# Patient Record
Sex: MALE | Race: WHITE | URBAN - METROPOLITAN AREA
[De-identification: names, ages, dates, MRNs, and addresses within clinical notes are randomized per-mention and may not be internally consistent; named-entity substitution may affect disease eponyms.]

---

## 2017-05-03 ENCOUNTER — HISTORICAL (OUTPATIENT)
Dept: SURGERY | Facility: HOSPITAL | Age: 47
End: 2017-05-03

## 2017-05-04 ENCOUNTER — HISTORICAL (OUTPATIENT)
Dept: ANESTHESIOLOGY | Facility: HOSPITAL | Age: 47
End: 2017-05-04

## 2021-10-19 ENCOUNTER — HISTORICAL (OUTPATIENT)
Dept: LAB | Facility: HOSPITAL | Age: 51
End: 2021-10-19

## 2021-10-19 LAB
ABS NEUT (OLG): 3.9 X10(3)/MCL (ref 1.5–6.9)
ALBUMIN SERPL-MCNC: 3.5 GM/DL (ref 3.5–5)
ALBUMIN/GLOB SERPL: 0.8 RATIO (ref 1.1–2)
ALP SERPL-CCNC: 53 UNIT/L (ref 40–150)
ALT SERPL-CCNC: 13 UNIT/L (ref 0–55)
AST SERPL-CCNC: 29 UNIT/L (ref 5–34)
BASOPHILS NFR BLD MANUAL: 0 % (ref 0–1)
BILIRUB SERPL-MCNC: 0.2 MG/DL
BILIRUBIN DIRECT+TOT PNL SERPL-MCNC: 0.1 MG/DL (ref 0–0.5)
BILIRUBIN DIRECT+TOT PNL SERPL-MCNC: 0.1 MG/DL (ref 0–0.8)
BUN SERPL-MCNC: 19 MG/DL (ref 8.4–25.7)
CALCIUM SERPL-MCNC: 9.5 MG/DL (ref 8.4–10.2)
CHLORIDE SERPL-SCNC: 105 MMOL/L (ref 98–107)
CO2 SERPL-SCNC: 31 MMOL/L (ref 22–29)
CREAT SERPL-MCNC: 0.75 MG/DL (ref 0.73–1.18)
EOSINOPHIL NFR BLD MANUAL: 0 % (ref 0–5)
ERYTHROCYTE [DISTWIDTH] IN BLOOD BY AUTOMATED COUNT: 14.8 % (ref 11.5–17)
GLOBULIN SER-MCNC: 4.2 GM/DL (ref 2.4–3.5)
GLUCOSE SERPL-MCNC: 86 MG/DL (ref 74–100)
GRANULOCYTES NFR BLD MANUAL: 51 % (ref 47–80)
HCT VFR BLD AUTO: 35.7 % (ref 42–52)
HGB BLD-MCNC: 11.2 GM/DL (ref 14–18)
LYMPHOCYTES NFR BLD MANUAL: 32 % (ref 15–40)
MCH RBC QN AUTO: 29 PG (ref 27–34)
MCHC RBC AUTO-ENTMCNC: 31 GM/DL (ref 31–36)
MCV RBC AUTO: 92 FL (ref 80–99)
MONOCYTES NFR BLD MANUAL: 17 % (ref 4–12)
PLATELET # BLD AUTO: 147 X10(3)/MCL (ref 140–400)
PLATELET # BLD EST: ADEQUATE 10*3/UL
PMV BLD AUTO: 11.4 FL (ref 6.8–10)
POTASSIUM SERPL-SCNC: 3.9 MMOL/L (ref 3.5–5.1)
PROT SERPL-MCNC: 7.7 GM/DL (ref 6.4–8.3)
RBC # BLD AUTO: 3.87 X10(6)/MCL (ref 4.7–6.1)
RBC MORPH BLD: NORMAL
SODIUM SERPL-SCNC: 143 MMOL/L (ref 136–145)
VALPROATE SERPL-MCNC: 56.5 UG/ML (ref 50–100)
WBC # SPEC AUTO: 7.7 X10(3)/MCL (ref 4.5–11.5)

## 2021-10-22 ENCOUNTER — HISTORICAL (OUTPATIENT)
Dept: WOUND CARE | Facility: HOSPITAL | Age: 51
End: 2021-10-22

## 2022-01-25 ENCOUNTER — HISTORICAL (OUTPATIENT)
Dept: LAB | Facility: HOSPITAL | Age: 52
End: 2022-01-25

## 2022-01-25 LAB
ABS NEUT (OLG): 2.9 X10(3)/MCL (ref 1.5–6.9)
ALBUMIN SERPL-MCNC: 3.4 GM/DL (ref 3.5–5)
ALBUMIN/GLOB SERPL: 0.8 RATIO (ref 1.1–2)
ALP SERPL-CCNC: 62 UNIT/L (ref 40–150)
ALT SERPL-CCNC: 23 UNIT/L (ref 0–55)
AST SERPL-CCNC: 41 UNIT/L (ref 5–34)
BASOPHILS NFR BLD MANUAL: 0 % (ref 0–1)
BILIRUB SERPL-MCNC: 0.1 MG/DL
BILIRUBIN DIRECT+TOT PNL SERPL-MCNC: 0 MG/DL (ref 0–0.8)
BILIRUBIN DIRECT+TOT PNL SERPL-MCNC: 0.1 MG/DL (ref 0–0.5)
BUN SERPL-MCNC: 32 MG/DL (ref 8.4–25.7)
CALCIUM SERPL-MCNC: 9.4 MG/DL (ref 8.7–10.5)
CHLORIDE SERPL-SCNC: 105 MMOL/L (ref 98–107)
CO2 SERPL-SCNC: 29 MMOL/L (ref 22–29)
CREAT SERPL-MCNC: 1.26 MG/DL (ref 0.73–1.18)
EOSINOPHIL NFR BLD MANUAL: 0 % (ref 0–5)
ERYTHROCYTE [DISTWIDTH] IN BLOOD BY AUTOMATED COUNT: 14.5 % (ref 11.5–17)
GLOBULIN SER-MCNC: 4 GM/DL (ref 2.4–3.5)
GLUCOSE SERPL-MCNC: 97 MG/DL (ref 74–100)
GRANULOCYTES NFR BLD MANUAL: 44 % (ref 47–80)
HCT VFR BLD AUTO: 38.2 % (ref 42–52)
HGB BLD-MCNC: 12.4 GM/DL (ref 14–18)
HYPOCHROMIA BLD QL SMEAR: ABNORMAL
LYMPHOCYTES NFR BLD MANUAL: 44 % (ref 15–40)
MCH RBC QN AUTO: 29 PG (ref 27–34)
MCHC RBC AUTO-ENTMCNC: 32 GM/DL (ref 31–36)
MCV RBC AUTO: 89 FL (ref 80–99)
MONOCYTES NFR BLD MANUAL: 12 % (ref 4–12)
OVALOCYTES BLD QL SMEAR: ABNORMAL
PLATELET # BLD AUTO: 135 X10(3)/MCL (ref 140–400)
PLATELET # BLD EST: ABNORMAL 10*3/UL
PMV BLD AUTO: 11.4 FL (ref 6.8–10)
POTASSIUM SERPL-SCNC: 4.8 MMOL/L (ref 3.5–5.1)
PROT SERPL-MCNC: 7.4 GM/DL (ref 6.4–8.3)
RBC # BLD AUTO: 4.29 X10(6)/MCL (ref 4.7–6.1)
RBC MORPH BLD: ABNORMAL
SODIUM SERPL-SCNC: 143 MMOL/L (ref 136–145)
URATE SERPL-MCNC: 5.1 MG/DL (ref 3.5–7.2)
WBC # SPEC AUTO: 6.4 X10(3)/MCL (ref 4.5–11.5)

## 2022-01-27 ENCOUNTER — HISTORICAL (OUTPATIENT)
Dept: LAB | Facility: HOSPITAL | Age: 52
End: 2022-01-27

## 2022-01-27 LAB — VALPROATE SERPL-MCNC: 53.1 UG/ML (ref 50–100)

## 2022-03-07 ENCOUNTER — HISTORICAL (OUTPATIENT)
Dept: RADIOLOGY | Facility: HOSPITAL | Age: 52
End: 2022-03-07

## 2022-03-08 ENCOUNTER — HISTORICAL (OUTPATIENT)
Dept: ADMINISTRATIVE | Facility: HOSPITAL | Age: 52
End: 2022-03-08

## 2022-04-30 NOTE — OP NOTE
Patient:   Andrew Gonzales             MRN: [id number]            FIN: [id number]               Age:   46 years     Sex:  Male     :  1970   Associated Diagnoses:   None   Author:   Sai Ahn DDS      Operative Note   Operative Information   Date/ Time:  2016 08:28:00.     Procedures Performed: Periodic Oral exam, BW x-rays x 4, Periodontal charting, scaling and root planing x 4 quadrants..     Preoperative Diagnosis: History of periodontal and dental decay..     Postoperative Diagnosis: Moderate periodontal disease..     Surgeon: Sai Ahn DDS.     Anesthesia: General.     Description of Procedure/Findings/    Complications:   Patient was brought to OR, sedated, intubated nasally and placed under general anesthesia.  Periodic oral exam, bw x-rays x 4, periodontal charting completed.  BOP, gingival inflammation, and perio pockets 4-6mm generalized were noted.  Heavy plaque and calculus noted.  Scaled and root planed all four quadrants using Cavitron with sterile water and hand scalers to fine scale.  No dental decay noted on x-ray or clinical exam.  Patient was cleaned, roused, extubated, and sent to recovery..     Findings: Moderate periodontal disease.  No dental decay..

## 2024-07-12 ENCOUNTER — LAB REQUISITION (OUTPATIENT)
Dept: LAB | Facility: HOSPITAL | Age: 54
End: 2024-07-12
Payer: MEDICAID

## 2024-07-12 DIAGNOSIS — R77.8 OTHER SPECIFIED ABNORMALITIES OF PLASMA PROTEINS: ICD-10-CM

## 2024-07-12 LAB — ALBUMIN SERPL-MCNC: 3.7 G/DL (ref 3.5–5)

## 2024-07-12 PROCEDURE — 82040 ASSAY OF SERUM ALBUMIN: CPT | Performed by: INTERNAL MEDICINE

## 2024-10-04 ENCOUNTER — LAB REQUISITION (OUTPATIENT)
Dept: LAB | Facility: HOSPITAL | Age: 54
End: 2024-10-04
Payer: MEDICAID

## 2024-10-04 DIAGNOSIS — R79.9 ABNORMAL FINDING OF BLOOD CHEMISTRY, UNSPECIFIED: ICD-10-CM

## 2024-10-04 DIAGNOSIS — R83.1: ICD-10-CM

## 2024-10-04 LAB
ALBUMIN SERPL-MCNC: 3.4 G/DL (ref 3.5–5)
ALBUMIN/GLOB SERPL: 1 RATIO (ref 1.1–2)
ALP SERPL-CCNC: 70 UNIT/L (ref 40–150)
ALT SERPL-CCNC: 10 UNIT/L (ref 0–55)
ANION GAP SERPL CALC-SCNC: 10 MEQ/L
AST SERPL-CCNC: 18 UNIT/L (ref 5–34)
BASOPHILS # BLD AUTO: 0.03 X10(3)/MCL
BASOPHILS NFR BLD AUTO: 0.4 %
BILIRUB SERPL-MCNC: 0.1 MG/DL
BUN SERPL-MCNC: 21.9 MG/DL (ref 8.4–25.7)
CALCIUM SERPL-MCNC: 9.1 MG/DL (ref 8.4–10.2)
CHLORIDE SERPL-SCNC: 104 MMOL/L (ref 98–107)
CO2 SERPL-SCNC: 28 MMOL/L (ref 22–29)
CREAT SERPL-MCNC: 0.84 MG/DL (ref 0.73–1.18)
CREAT/UREA NIT SERPL: 26
EOSINOPHIL # BLD AUTO: 0.17 X10(3)/MCL (ref 0–0.9)
EOSINOPHIL NFR BLD AUTO: 2.2 %
ERYTHROCYTE [DISTWIDTH] IN BLOOD BY AUTOMATED COUNT: 13.8 % (ref 11.5–17)
GFR SERPLBLD CREATININE-BSD FMLA CKD-EPI: >60 ML/MIN/1.73/M2
GLOBULIN SER-MCNC: 3.5 GM/DL (ref 2.4–3.5)
GLUCOSE SERPL-MCNC: 68 MG/DL (ref 74–100)
HCT VFR BLD AUTO: 36.8 % (ref 42–52)
HGB BLD-MCNC: 11.8 G/DL (ref 14–18)
IMM GRANULOCYTES # BLD AUTO: 0.02 X10(3)/MCL (ref 0–0.04)
IMM GRANULOCYTES NFR BLD AUTO: 0.3 %
LYMPHOCYTES # BLD AUTO: 2.56 X10(3)/MCL (ref 0.6–4.6)
LYMPHOCYTES NFR BLD AUTO: 33.6 %
MCH RBC QN AUTO: 29.1 PG (ref 27–31)
MCHC RBC AUTO-ENTMCNC: 32.1 G/DL (ref 33–36)
MCV RBC AUTO: 90.6 FL (ref 80–94)
MONOCYTES # BLD AUTO: 0.82 X10(3)/MCL (ref 0.1–1.3)
MONOCYTES NFR BLD AUTO: 10.8 %
NEUTROPHILS # BLD AUTO: 4.02 X10(3)/MCL (ref 2.1–9.2)
NEUTROPHILS NFR BLD AUTO: 52.7 %
NRBC BLD AUTO-RTO: 0 %
PLATELET # BLD AUTO: 152 X10(3)/MCL (ref 130–400)
PMV BLD AUTO: 12.1 FL (ref 7.4–10.4)
POTASSIUM SERPL-SCNC: 4.1 MMOL/L (ref 3.5–5.1)
PROT SERPL-MCNC: 6.9 GM/DL (ref 6.4–8.3)
RBC # BLD AUTO: 4.06 X10(6)/MCL (ref 4.7–6.1)
SODIUM SERPL-SCNC: 142 MMOL/L (ref 136–145)
VALPROATE SERPL-MCNC: 45.1 UG/ML (ref 50–100)
WBC # BLD AUTO: 7.62 X10(3)/MCL (ref 4.5–11.5)

## 2024-10-04 PROCEDURE — 80164 ASSAY DIPROPYLACETIC ACD TOT: CPT | Performed by: INTERNAL MEDICINE

## 2024-10-04 PROCEDURE — 85025 COMPLETE CBC W/AUTO DIFF WBC: CPT | Performed by: INTERNAL MEDICINE

## 2024-10-04 PROCEDURE — 80053 COMPREHEN METABOLIC PANEL: CPT | Performed by: INTERNAL MEDICINE

## 2024-12-06 ENCOUNTER — LAB REQUISITION (OUTPATIENT)
Dept: LAB | Facility: HOSPITAL | Age: 54
End: 2024-12-06
Payer: MEDICAID

## 2024-12-06 DIAGNOSIS — E78.00 PURE HYPERCHOLESTEROLEMIA, UNSPECIFIED: ICD-10-CM

## 2024-12-06 LAB
CHOLEST SERPL-MCNC: 144 MG/DL
CHOLEST/HDLC SERPL: 5 {RATIO} (ref 0–5)
HDLC SERPL-MCNC: 27 MG/DL (ref 35–60)
LDLC SERPL CALC-MCNC: 91 MG/DL (ref 50–140)
TRIGL SERPL-MCNC: 131 MG/DL (ref 34–140)
VLDLC SERPL CALC-MCNC: 26 MG/DL

## 2024-12-06 PROCEDURE — 80061 LIPID PANEL: CPT | Performed by: INTERNAL MEDICINE

## 2025-04-16 ENCOUNTER — LAB REQUISITION (OUTPATIENT)
Dept: LAB | Facility: HOSPITAL | Age: 55
End: 2025-04-16
Payer: MEDICAID

## 2025-04-16 DIAGNOSIS — E78.5 HYPERLIPIDEMIA, UNSPECIFIED: ICD-10-CM

## 2025-04-16 DIAGNOSIS — D50.9 IRON DEFICIENCY ANEMIA, UNSPECIFIED: ICD-10-CM

## 2025-04-16 DIAGNOSIS — F44.5 CONVERSION DISORDER WITH SEIZURES OR CONVULSIONS: ICD-10-CM

## 2025-04-16 DIAGNOSIS — F31.9 BIPOLAR DISORDER, UNSPECIFIED: ICD-10-CM

## 2025-04-16 DIAGNOSIS — F80.9 DEVELOPMENTAL DISORDER OF SPEECH AND LANGUAGE, UNSPECIFIED: ICD-10-CM

## 2025-04-16 LAB
ALBUMIN SERPL-MCNC: 3.6 G/DL (ref 3.5–5)
ALBUMIN/GLOB SERPL: 1 RATIO (ref 1.1–2)
ALP SERPL-CCNC: 50 UNIT/L (ref 40–150)
ALT SERPL-CCNC: 12 UNIT/L (ref 0–55)
ANION GAP SERPL CALC-SCNC: 6 MEQ/L
AST SERPL-CCNC: 23 UNIT/L (ref 11–45)
BASOPHILS # BLD AUTO: 0.03 X10(3)/MCL
BASOPHILS NFR BLD AUTO: 0.4 %
BILIRUB SERPL-MCNC: 0.2 MG/DL
BUN SERPL-MCNC: 26.5 MG/DL (ref 8.4–25.7)
CALCIUM SERPL-MCNC: 9 MG/DL (ref 8.4–10.2)
CHLORIDE SERPL-SCNC: 107 MMOL/L (ref 98–107)
CO2 SERPL-SCNC: 26 MMOL/L (ref 22–29)
CREAT SERPL-MCNC: 0.8 MG/DL (ref 0.72–1.25)
CREAT/UREA NIT SERPL: 33
EOSINOPHIL # BLD AUTO: 0.18 X10(3)/MCL (ref 0–0.9)
EOSINOPHIL NFR BLD AUTO: 2.4 %
ERYTHROCYTE [DISTWIDTH] IN BLOOD BY AUTOMATED COUNT: 13.6 % (ref 11.5–17)
GFR SERPLBLD CREATININE-BSD FMLA CKD-EPI: >60 ML/MIN/1.73/M2
GLOBULIN SER-MCNC: 3.5 GM/DL (ref 2.4–3.5)
GLUCOSE SERPL-MCNC: 72 MG/DL (ref 74–100)
HCT VFR BLD AUTO: 37.8 % (ref 42–52)
HGB BLD-MCNC: 12.6 G/DL (ref 14–18)
IMM GRANULOCYTES # BLD AUTO: 0.03 X10(3)/MCL (ref 0–0.04)
IMM GRANULOCYTES NFR BLD AUTO: 0.4 %
LYMPHOCYTES # BLD AUTO: 2.95 X10(3)/MCL (ref 0.6–4.6)
LYMPHOCYTES NFR BLD AUTO: 39 %
MCH RBC QN AUTO: 30.8 PG (ref 27–31)
MCHC RBC AUTO-ENTMCNC: 33.3 G/DL (ref 33–36)
MCV RBC AUTO: 92.4 FL (ref 80–94)
MONOCYTES # BLD AUTO: 1.24 X10(3)/MCL (ref 0.1–1.3)
MONOCYTES NFR BLD AUTO: 16.4 %
NEUTROPHILS # BLD AUTO: 3.13 X10(3)/MCL (ref 2.1–9.2)
NEUTROPHILS NFR BLD AUTO: 41.4 %
NRBC BLD AUTO-RTO: 0 %
PLATELET # BLD AUTO: 169 X10(3)/MCL (ref 130–400)
PMV BLD AUTO: 12 FL (ref 7.4–10.4)
POTASSIUM SERPL-SCNC: 4.7 MMOL/L (ref 3.5–5.1)
PROT SERPL-MCNC: 7.1 GM/DL (ref 6.4–8.3)
RBC # BLD AUTO: 4.09 X10(6)/MCL (ref 4.7–6.1)
SODIUM SERPL-SCNC: 139 MMOL/L (ref 136–145)
VALPROATE SERPL-MCNC: 59.8 UG/ML (ref 50–100)
WBC # BLD AUTO: 7.56 X10(3)/MCL (ref 4.5–11.5)

## 2025-04-16 PROCEDURE — 80053 COMPREHEN METABOLIC PANEL: CPT | Performed by: INTERNAL MEDICINE

## 2025-04-16 PROCEDURE — 80164 ASSAY DIPROPYLACETIC ACD TOT: CPT | Performed by: INTERNAL MEDICINE

## 2025-04-16 PROCEDURE — 85025 COMPLETE CBC W/AUTO DIFF WBC: CPT | Performed by: INTERNAL MEDICINE
